# Patient Record
Sex: FEMALE | Race: WHITE | NOT HISPANIC OR LATINO | Employment: OTHER | URBAN - METROPOLITAN AREA
[De-identification: names, ages, dates, MRNs, and addresses within clinical notes are randomized per-mention and may not be internally consistent; named-entity substitution may affect disease eponyms.]

---

## 2017-10-06 ENCOUNTER — APPOINTMENT (OUTPATIENT)
Dept: WOMENS IMAGING | Facility: HOSPITAL | Age: 82
End: 2017-10-06

## 2017-10-06 PROCEDURE — 77063 BREAST TOMOSYNTHESIS BI: CPT | Performed by: RADIOLOGY

## 2017-10-06 PROCEDURE — 77067 SCR MAMMO BI INCL CAD: CPT | Performed by: RADIOLOGY

## 2018-10-08 ENCOUNTER — APPOINTMENT (OUTPATIENT)
Dept: WOMENS IMAGING | Facility: HOSPITAL | Age: 83
End: 2018-10-08

## 2018-10-08 PROCEDURE — 77067 SCR MAMMO BI INCL CAD: CPT | Performed by: RADIOLOGY

## 2018-10-08 PROCEDURE — 77063 BREAST TOMOSYNTHESIS BI: CPT | Performed by: RADIOLOGY

## 2020-08-12 ENCOUNTER — TRANSCRIBE ORDERS (OUTPATIENT)
Dept: ADMINISTRATIVE | Facility: HOSPITAL | Age: 85
End: 2020-08-12

## 2020-08-12 DIAGNOSIS — R42 DIZZY: ICD-10-CM

## 2020-08-12 DIAGNOSIS — R51.9 NONINTRACTABLE HEADACHE, UNSPECIFIED CHRONICITY PATTERN, UNSPECIFIED HEADACHE TYPE: Primary | ICD-10-CM

## 2020-08-20 ENCOUNTER — HOSPITAL ENCOUNTER (OUTPATIENT)
Dept: CT IMAGING | Facility: HOSPITAL | Age: 85
Discharge: HOME | End: 2020-08-20
Admitting: NURSE PRACTITIONER

## 2020-08-20 DIAGNOSIS — R51.9 NONINTRACTABLE HEADACHE, UNSPECIFIED CHRONICITY PATTERN, UNSPECIFIED HEADACHE TYPE: ICD-10-CM

## 2020-08-20 DIAGNOSIS — R42 DIZZY: ICD-10-CM

## 2020-08-20 PROCEDURE — 70450 CT HEAD/BRAIN W/O DYE: CPT | Performed by: RADIOLOGY

## 2020-08-20 PROCEDURE — 70450 CT HEAD/BRAIN W/O DYE: CPT

## 2020-08-26 ENCOUNTER — APPOINTMENT (OUTPATIENT)
Dept: WOMENS IMAGING | Facility: HOSPITAL | Age: 85
End: 2020-08-26

## 2020-08-26 PROCEDURE — 77063 BREAST TOMOSYNTHESIS BI: CPT | Performed by: RADIOLOGY

## 2020-08-26 PROCEDURE — 77067 SCR MAMMO BI INCL CAD: CPT | Performed by: RADIOLOGY

## 2021-07-14 ENCOUNTER — OUTSIDE FACILITY SERVICE (OUTPATIENT)
Dept: CARDIOLOGY | Facility: CLINIC | Age: 86
End: 2021-07-14

## 2021-07-14 PROCEDURE — 78452 HT MUSCLE IMAGE SPECT MULT: CPT | Performed by: INTERNAL MEDICINE

## 2021-07-14 PROCEDURE — 93018 CV STRESS TEST I&R ONLY: CPT | Performed by: INTERNAL MEDICINE

## 2023-04-28 ENCOUNTER — OFFICE VISIT (OUTPATIENT)
Dept: UROLOGY | Facility: CLINIC | Age: 88
End: 2023-04-28
Payer: MEDICARE

## 2023-04-28 VITALS
HEART RATE: 85 BPM | WEIGHT: 150 LBS | DIASTOLIC BLOOD PRESSURE: 82 MMHG | BODY MASS INDEX: 25.61 KG/M2 | SYSTOLIC BLOOD PRESSURE: 182 MMHG | HEIGHT: 64 IN

## 2023-04-28 DIAGNOSIS — N39.0 RECURRENT UTI (URINARY TRACT INFECTION): Primary | ICD-10-CM

## 2023-04-28 DIAGNOSIS — R33.9 INCOMPLETE EMPTYING OF BLADDER: ICD-10-CM

## 2023-04-28 DIAGNOSIS — R35.0 FREQUENCY OF MICTURITION: ICD-10-CM

## 2023-04-28 LAB
BILIRUB BLD-MCNC: NEGATIVE MG/DL
CLARITY, POC: CLEAR
COLOR UR: YELLOW
EXPIRATION DATE: ABNORMAL
GLUCOSE UR STRIP-MCNC: NEGATIVE MG/DL
KETONES UR QL: NEGATIVE
LEUKOCYTE EST, POC: ABNORMAL
Lab: ABNORMAL
NITRITE UR-MCNC: NEGATIVE MG/ML
PH UR: 6.5 [PH] (ref 5–8)
PROT UR STRIP-MCNC: NEGATIVE MG/DL
RBC # UR STRIP: NEGATIVE /UL
SP GR UR: 1.01 (ref 1–1.03)
UROBILINOGEN UR QL: NORMAL

## 2023-04-28 PROCEDURE — 87086 URINE CULTURE/COLONY COUNT: CPT | Performed by: NURSE PRACTITIONER

## 2023-04-28 RX ORDER — HYDROCHLOROTHIAZIDE 25 MG/1
1 TABLET ORAL DAILY
COMMUNITY
Start: 2023-03-08

## 2023-04-28 RX ORDER — PHENAZOPYRIDINE HYDROCHLORIDE 200 MG/1
200 TABLET, FILM COATED ORAL 3 TIMES DAILY PRN
Qty: 20 TABLET | Refills: 0 | Status: SHIPPED | OUTPATIENT
Start: 2023-04-28

## 2023-04-28 RX ORDER — METOPROLOL SUCCINATE 50 MG/1
TABLET, EXTENDED RELEASE ORAL
COMMUNITY
Start: 2023-03-30

## 2023-04-28 RX ORDER — ALBUTEROL SULFATE 90 UG/1
AEROSOL, METERED RESPIRATORY (INHALATION) SEE ADMIN INSTRUCTIONS
COMMUNITY
Start: 2023-04-12

## 2023-04-28 RX ORDER — ASPIRIN 81 MG/1
81 TABLET ORAL DAILY
COMMUNITY

## 2023-04-28 RX ORDER — NIFEDIPINE 30 MG/1
1 TABLET, FILM COATED, EXTENDED RELEASE ORAL DAILY
COMMUNITY
Start: 2023-02-21

## 2023-04-28 RX ORDER — MECLIZINE HYDROCHLORIDE 25 MG/1
TABLET ORAL
COMMUNITY
Start: 2023-01-19

## 2023-04-28 RX ORDER — OMEPRAZOLE 20 MG/1
1 CAPSULE, DELAYED RELEASE ORAL DAILY
COMMUNITY
Start: 2023-02-08

## 2023-04-28 RX ORDER — BUSPIRONE HYDROCHLORIDE 5 MG/1
5 TABLET ORAL 3 TIMES DAILY PRN
COMMUNITY
Start: 2023-04-11

## 2023-04-28 RX ORDER — GENTAMICIN SULFATE 40 MG/ML
80 INJECTION, SOLUTION INTRAMUSCULAR; INTRAVENOUS ONCE
Status: COMPLETED | OUTPATIENT
Start: 2023-04-28 | End: 2023-04-28

## 2023-04-28 RX ORDER — LEVOTHYROXINE SODIUM 0.1 MG/1
TABLET ORAL
COMMUNITY
Start: 2023-02-08

## 2023-04-28 RX ORDER — ENALAPRIL MALEATE 20 MG/1
1 TABLET ORAL EVERY 12 HOURS SCHEDULED
COMMUNITY
Start: 2023-04-11

## 2023-04-28 RX ORDER — ONDANSETRON 4 MG/1
4 TABLET, FILM COATED ORAL EVERY 12 HOURS PRN
Qty: 20 TABLET | Refills: 0 | Status: SHIPPED | OUTPATIENT
Start: 2023-04-28

## 2023-04-28 RX ORDER — NITROFURANTOIN 25; 75 MG/1; MG/1
CAPSULE ORAL
Qty: 56 CAPSULE | Refills: 3 | Status: SHIPPED | OUTPATIENT
Start: 2023-04-28

## 2023-04-28 RX ADMIN — GENTAMICIN SULFATE 80 MG: 40 INJECTION, SOLUTION INTRAMUSCULAR; INTRAVENOUS at 16:19

## 2023-04-28 NOTE — PROGRESS NOTES
Chief Complaint  RECURRENT UTI /DYSURIA (NEW PT WITH UTI/ACUTE CYSTITIS/DYSURIA)    Adelaida Hines presents to [facility] for   History of Present Illness     MS MILADY HINES is a pleasant 87-year-old female who presents to clinic today for evaluation as a new patient consult. Patient has been referred to clinic by her PCP with concerns of recurrent UTIs. She reports NUMEROUS OTHER CONCERNS INCLUDING: constant burning each time she has an infection, BACK PAIN, FREQUENCY, URGENCY, patient reports her symptoms have been ongoing for about a year, recently becoming very bothersome to her.     0n initial presentation in clinic today.She reports she is not doing well. She mentions she has recurrent UTIs. She notes it has been going on for over a year. She mentions it is resistant to any antibiotics. She notes she can not take Bactrim due to hypertension medication.Her urine dip show trace leukocytes otherwise negative for any infection, it is negative for gross/microhematuria, PVR-0ml     She reports she has been on 3 antibiotics in the past 2 months. She mentions she can not remember the name of antibiotics. She reports she can not take enalapril. She mentions she has burning all the time. She notes she had a culture done and was told she had E. coli. She mentions she does not like water. She notes since she had COVID-19 3 years ago, she has not been able to taste anything. She reports she has not been able to eat very well. She admits she drinks water and Gatorade. She states she works 96 hours the last 2 weeks.     She mentions she has urinary frequency and urgency. She mentions she is up all night to urinate. She denies wearing diapers. She denies having a hysterectomy. She has 3 children. She denies ever having kidney stones. She denies feeling any prolapse. She denies nausea.    Objective   Vital Signs:   BP (!) 182/82 (BP Location: Left arm, Patient  "Position: Sitting, Cuff Size: Adult)   Pulse 85   Ht 162.6 cm (64\")   Wt 68 kg (150 lb)   BMI 25.75 kg/m²       ROS:   Review of Systems   Constitutional: Negative.  Negative for activity change, appetite change, diaphoresis, fatigue, fever, unexpected weight gain and unexpected weight loss.   HENT: Negative for congestion, ear discharge, ear pain, nosebleeds, rhinorrhea, sinus pressure and sore throat.    Eyes: Negative for blurred vision, double vision, photophobia, pain, redness and visual disturbance.   Respiratory: Negative for apnea, cough, chest tightness, shortness of breath, wheezing and stridor.    Cardiovascular: Negative for chest pain and palpitations.   Gastrointestinal: Negative for abdominal distention, abdominal pain, constipation, diarrhea, nausea and vomiting.   Endocrine: Negative for polydipsia, polyphagia and polyuria.   Genitourinary: Positive for dysuria, frequency and urinary incontinence. Negative for decreased urine volume, difficulty urinating, flank pain, hematuria and urgency.   Musculoskeletal: Positive for back pain. Negative for arthralgias and joint swelling.   Skin: Negative for pallor, rash and wound.   Neurological: Negative for dizziness, tremors, syncope, weakness, light-headedness, memory problem and confusion.   Psychiatric/Behavioral: Negative for behavioral problems.      Physical Exam  Constitutional:       General: She is not in acute distress.     Appearance: She is well-developed.   HENT:      Head: Normocephalic and atraumatic.   Eyes:      Pupils: Pupils are equal, round, and reactive to light.   Neck:      Thyroid: No thyromegaly.      Trachea: No tracheal deviation.   Cardiovascular:      Rate and Rhythm: Normal rate and regular rhythm.      Heart sounds: No murmur heard.  Pulmonary:      Effort: Pulmonary effort is normal. No respiratory distress.      Breath sounds: Normal breath sounds. No stridor. No wheezing.   Abdominal:      General: Bowel sounds are " normal.      Palpations: Abdomen is soft.      Tenderness: There is no abdominal tenderness.   Genitourinary:     Labia:         Right: No tenderness.         Left: No tenderness.       Vagina: Normal. No vaginal discharge.   Musculoskeletal:         General: No deformity. Normal range of motion.      Cervical back: Normal range of motion.   Skin:     General: Skin is warm and dry.      Coloration: Skin is not pale.      Findings: No erythema or rash.   Neurological:      Mental Status: She is alert and oriented to person, place, and time.      Cranial Nerves: No cranial nerve deficit.      Sensory: No sensory deficit.      Coordination: Coordination normal.   Psychiatric:         Behavior: Behavior normal.         Thought Content: Thought content normal.         Judgment: Judgment normal.        Result Review :     UA        4/28/2023    15:04   Urinalysis   Ketones, UA Negative     Leukocytes, UA Trace            Assessment and Plan    Problem List Items Addressed This Visit    None  Visit Diagnoses     Recurrent UTI (urinary tract infection)    -  Primary    Relevant Medications    nitrofurantoin, macrocrystal-monohydrate, (Macrobid) 100 MG capsule    ondansetron (Zofran) 4 MG tablet    phenazopyridine (Pyridium) 200 MG tablet    gentamicin (GARAMYCIN) injection 80 mg (Completed)    Frequency of micturition        Relevant Medications    nitrofurantoin, macrocrystal-monohydrate, (Macrobid) 100 MG capsule    ondansetron (Zofran) 4 MG tablet    phenazopyridine (Pyridium) 200 MG tablet    Other Relevant Orders    POC Urinalysis Dipstick, Automated (Completed)    Urine Culture - Urine, Urine, Random Void    Incomplete emptying of bladder        Relevant Medications    nitrofurantoin, macrocrystal-monohydrate, (Macrobid) 100 MG capsule    ondansetron (Zofran) 4 MG tablet    phenazopyridine (Pyridium) 200 MG tablet    Other Relevant Orders    Bladder Scan (Completed)           ASSESSMENT  RECURRENT UTI/URINE  FRQUENCY/ATROPHIC VAG  MS MILADY HINES is a pleasant 87-year-old female who presents to clinic today for evaluation with concerns of recurrent UTIs. She reports NUMEROUS OTHER CONCERNS INCLUDING: constant burning each time she has an infection, BACK PAIN, FREQUENCY, URGENCY, patient reports her symptoms have been ongoing for about a year, recently becoming very bothersome to her.Her urine dipstick show  trace leukocytes otherwise negative for any infection, it is negative for gross/microhematuria, PVR-0ml    She reports doing relatively better on her antibiotic prophylaxis, and would like to continue. We discussed the types of organisms that are found in the urinary tract indicating that the vast majority are results of the patient's own gastrointestinal eric.  We discussed how many of the antibiotics that are utilized can actually exacerbate these infections by creating resistant organisms and there is only a very few antibiotics that are concentrated in the urine and do not affect the rectal reservoir nor cause recurrent yeast vaginitis.      We discussed the risk factors for recurrent infections being intercourse in younger patients and atrophic changes in older patients.  We discussed the symptoms that are found including pain, pressure, burning, frequency, urgency suprapubic pain and painful intercourse.  I discussed upper tract symptoms including fevers, chills, and indicated the workup would be much more aggressive if the patient were to present with recurrent infections in the face of upper tract symptomatology such as fever. I discussed the history of vesicoureteral reflux in young patients and finally chronic renal scarring as a result of such.     Again, we discussed detrusor instability which is an  irritative bladder symptomatology most likely related to factors such as intake of bladder irritants, postinfectious irritation, prolapse, with a very large differential diagnosis.  The mainstay of  treatment has been tight cholinergics which basically caused the bladder to have decreased contractility.  We have discussed the side effects of these treatments including dry mouth, double vision, and increasing constipation. Patient adamant at this time to try meds.                PLAN  GENTAMYCIN 80 MG IM X 1 DOSE GIVEN IN CLINIC    We resent her urine for culture. I will call her with results if any bacteria growth not sensitive to her current therapy of Macrobid.    Will START Macrobid 100 mg PO Daily Suppressive Therapy.     She HAS BEEN encouraged to increase her p.o. fluid intake to at least 1 to 2 L daily and avoid bladder irritants such as caffeine products, spicy foods, and citrusy foods.     I recommend concomitant probiotics with treatment with antibiotics to protect the rectal reservoir including over-the-counter yogurt preparations to louis oral pills containing the appropriate probiotics. Patient reports the diligent use of Probiotics.    She is to also continue other medications for yeast vaginitis, atrophic vaginitis as prescribed above.    Will see her back in 6 weeks for Follow up in clinic and recheck her urinalysis at that time.    Patient is agreeable to plan of care.    Patient reports that she is not currently experiencing any symptoms of urinary incontinence.    BMI is >= 25 and <30. (Overweight) The following options were offered after discussion;: weight loss educational material (shared in after visit summary), exercise counseling/recommendations and nutrition counseling/recommendations    RADIOLOGY (CT AND/OR KUB):    CT Abdomen and Pelvis: No results found for this or any previous visit.     CT Stone Protocol: No results found for this or any previous visit.     KUB: No results found for this or any previous visit.       LABS (3 MONTHS):    Office Visit on 04/28/2023   Component Date Value Ref Range Status   • Color 04/28/2023 Yellow  Yellow, Straw, Dark Yellow, [place] Final   •  Clarity, UA 04/28/2023 Clear  Clear Final   • Specific Gravity  04/28/2023 1.015  1.005 - 1.030 Final   • pH, Urine 04/28/2023 6.5  5.0 - 8.0 Final   • Leukocytes 04/28/2023 Trace (A)  Negative Final   • Nitrite, UA 04/28/2023 Negative  Negative Final   • Protein, POC 04/28/2023 Negative  Negative mg/dL Final   • Glucose, UA 04/28/2023 Negative  Negative mg/dL Final   • Ketones, UA 04/28/2023 Negative  Negative Final   • Urobilinogen, UA 04/28/2023 Normal  Normal, 0.2 E.U./dL Final   • Bilirubin 04/28/2023 Negative  Negative Final   • Blood, UA 04/28/2023 Negative  Negative Final   • Lot Number 04/28/2023 [id number]   Final   • Expiration Date 04/28/2023 2/8/243   Final          Smoking Cessation Counseling:  Former smoker.  Patient does not currently use any tobacco products.     Follow Up   Return in about 6 weeks (around 6/12/2023) for Next scheduled follow up, RECURRENT UTI/DYSURIA/DETRUSSOR INSTABILITY.       1. Recurrent UTI  - Gentamicin injection given today.  - Continue Macrobid as prescribed.  - Pyridium prescribed for 3 days.  - Increase fluids.  - Follow-up in 1 month.  - Advised to stay away from caffeine, citrus, spices, and etc.    2. Atrophic vaginitis  - Premarin cream 2 times a week.  - Lidocaine prescribed for pain.    Patient was given instructions and counseling regarding her condition or for health maintenance advice. Please see specific information pulled into the AVS if appropriate.          This document has been electronically signed by Griselda Cheng-Akwa, APRN   April 30, 2023 23:22 EDT      Dictated Utilizing Dragon Dictation: Part of this note may be an electronic transcription/translation of spoken language to printed text using the Dragon Dictation System.    Transcribed from ambient dictation for Griselda Cheng-Akwa, APRN by Judi Blue.  04/28/23   17:05 EDT    Patient or patient representative verbalized consent to the visit recording.  I have personally performed the  services described in this document as transcribed by the above individual, and it is both accurate and complete.  Griselda Cheng-Akwa, APRN  4/30/2023  23:07 EDT

## 2023-05-01 LAB
BACTERIA UR CULT: NORMAL
BACTERIA UR CULT: NORMAL

## 2023-05-02 ENCOUNTER — TELEPHONE (OUTPATIENT)
Dept: UROLOGY | Facility: CLINIC | Age: 88
End: 2023-05-02
Payer: MEDICARE

## 2023-05-02 NOTE — TELEPHONE ENCOUNTER
Called patient to check on her post clinic visit, and to discuss urine culture results showing mixed urogenital eric, otherwise negative for any infection at this time.      She is currently on Macrobid prophylaxis for her recurrent UTIs and will continue taking 1 tablet nightly.  She has been encouraged to increase her p.o. fluid intake, continue therapy, in clinic as discussed 06/12/2023    Left a message on her voicemail to call with any concerns she may have    Thank you    Urine Culture Final report    Result 1 Comment     Comment: Mixed urogenital eric   Less than 10,000 colonies/mL

## 2023-05-03 ENCOUNTER — TELEPHONE (OUTPATIENT)
Dept: UROLOGY | Facility: CLINIC | Age: 88
End: 2023-05-03
Payer: MEDICARE

## 2023-05-03 NOTE — TELEPHONE ENCOUNTER
Spoke with patient she is unable to take macrobid do to nausea and unable to sleep so she has stopped it she did say she was increasing water intake and going to do that to see if it helped with infections Chloé is aware

## 2023-05-03 NOTE — TELEPHONE ENCOUNTER
----- Message from Griselda Cheng-Akwa, APRN sent at 5/2/2023  6:17 PM EDT -----  Call patient to check on her post clinic visit, and to discuss urine culture results showing mixed urogenital eric, otherwise negative for any infection at this time.      She is currently on Macrobid prophylaxis for her recurrent UTIs and will continue taking 1 tablet nightly.  She has been encouraged to increase her p.o. fluid intake, continue therapy, in clinic as discussed 06/12/2023    FALLON GARCIA

## 2023-06-12 ENCOUNTER — OFFICE VISIT (OUTPATIENT)
Dept: UROLOGY | Facility: CLINIC | Age: 88
End: 2023-06-12
Payer: MEDICARE

## 2023-06-12 VITALS
WEIGHT: 148.6 LBS | DIASTOLIC BLOOD PRESSURE: 80 MMHG | HEIGHT: 64 IN | HEART RATE: 84 BPM | SYSTOLIC BLOOD PRESSURE: 164 MMHG | BODY MASS INDEX: 25.37 KG/M2

## 2023-06-12 DIAGNOSIS — N95.2 ATROPHIC VAGINITIS: ICD-10-CM

## 2023-06-12 DIAGNOSIS — N34.3 DYSURIA-FREQUENCY SYNDROME: ICD-10-CM

## 2023-06-12 DIAGNOSIS — K21.9 GASTROESOPHAGEAL REFLUX DISEASE, UNSPECIFIED WHETHER ESOPHAGITIS PRESENT: ICD-10-CM

## 2023-06-12 DIAGNOSIS — T17.308D CHOKING, SUBSEQUENT ENCOUNTER: ICD-10-CM

## 2023-06-12 DIAGNOSIS — N39.0 RECURRENT UTI (URINARY TRACT INFECTION): Primary | ICD-10-CM

## 2023-06-12 LAB
BILIRUB BLD-MCNC: NEGATIVE MG/DL
CLARITY, POC: CLEAR
COLOR UR: YELLOW
EXPIRATION DATE: ABNORMAL
GLUCOSE UR STRIP-MCNC: NEGATIVE MG/DL
KETONES UR QL: NEGATIVE
LEUKOCYTE EST, POC: ABNORMAL
Lab: ABNORMAL
NITRITE UR-MCNC: NEGATIVE MG/ML
PH UR: 5 [PH] (ref 5–8)
PROT UR STRIP-MCNC: NEGATIVE MG/DL
RBC # UR STRIP: NEGATIVE /UL
SP GR UR: 1.01 (ref 1–1.03)
UROBILINOGEN UR QL: NORMAL

## 2023-06-12 RX ORDER — GENTAMICIN SULFATE 40 MG/ML
80 INJECTION, SOLUTION INTRAMUSCULAR; INTRAVENOUS ONCE
Status: COMPLETED | OUTPATIENT
Start: 2023-06-12 | End: 2023-06-12

## 2023-06-12 RX ADMIN — GENTAMICIN SULFATE 80 MG: 40 INJECTION, SOLUTION INTRAMUSCULAR; INTRAVENOUS at 13:51

## 2023-06-12 NOTE — PROGRESS NOTES
Chief Complaint  Recurrent Uti's and Difficulty Urinating (6 week fu )    Adelaida Hines presents to [facility] for UTIs/DYSURIA/ATROPHIC VAGINITIS  Difficulty Urinating  Associated symptoms include fatigue and myalgias. Pertinent negatives include no abdominal pain, arthralgias, chest pain, congestion, coughing, diaphoresis, fever, joint swelling, nausea, rash, sore throat, vomiting or weakness.     Mrs. MILADY HINES is a pleasant 87-year-old female who returns to clinic today for evaluation. This is a 6-week follow-up with prior complaints of recurrent UTIs, overactive bladder, complicated by constant burning with urination, frequency, urgency, and back pain.     When initially evaluated, the patient had denied CVA tenderness or abdominal pain. She reported her symptoms had been ongoing for a year and were becoming very bothersome to her. Her urine during her initial evaluation was completely negative for any infection. It, however, did show trace microscopic hematuria and the culture did grow mixed eric. She was placed on antibiotic prophylaxis secondary to her worsening symptoms. However, patient reports she has not been taking it because the Macrobid made her shake and she stayed nervous. She was also placed on Pyridium for burning with urination which she also did not take.     On her initial evaluation in clinic today, she is in no apparent discomfort, reports doing better. Nevertheless, urine today still show 1+ leukocyte esterase. Negative for nitrites. Negative for gross/microscopic hematuria. A postvoid residual is 0 mL.    OVERALL, The patient is doing well and at her baseline she states. Her bladder symptoms have improved. She had some nitrofurantoin at home, but she did not take it. She has been losing weight. She drinks Boost or Ensure secondary to decreased p.o intake.  I recommend that she following with her PCP for labs and  "reevaluation was a discussed appetite stimulants..    She has GERD, and reports she has been choking. She chokes on food. She takes Prilosec. She states that she does not want to see a GI doctor but ultimately agrees to seeing one. She states that she drinks about 4 16.9 ounces bottles of water a day.     She works 80 to 96 hours a week. She lives alone.      Objective   Vital Signs:   /80   Pulse 84   Ht 162.6 cm (64\")   Wt 67.4 kg (148 lb 9.6 oz)   BMI 25.51 kg/m²       ROS:   Review of Systems   Constitutional:  Positive for activity change and fatigue. Negative for appetite change, diaphoresis, fever, unexpected weight gain and unexpected weight loss.   HENT:  Negative for congestion, ear discharge, ear pain, nosebleeds, rhinorrhea, sinus pressure and sore throat.    Eyes:  Negative for blurred vision, double vision, photophobia, pain, redness and visual disturbance.   Respiratory:  Negative for apnea, cough, chest tightness, shortness of breath, wheezing and stridor.    Cardiovascular:  Negative for chest pain and palpitations.   Gastrointestinal:  Positive for constipation. Negative for abdominal distention, abdominal pain, diarrhea, nausea and vomiting.   Endocrine: Negative for polydipsia, polyphagia and polyuria.   Genitourinary:  Positive for difficulty urinating, dysuria, frequency, pelvic pain, urgency and urinary incontinence. Negative for decreased urine volume, flank pain and hematuria.   Musculoskeletal:  Positive for back pain and myalgias. Negative for arthralgias and joint swelling.   Skin:  Negative for pallor, rash and wound.   Neurological:  Positive for dizziness. Negative for tremors, syncope, weakness, light-headedness, memory problem and confusion.   Hematological:  Bruises/bleeds easily.   Psychiatric/Behavioral:  Positive for sleep disturbance and stress. Negative for behavioral problems. The patient is nervous/anxious.       Physical Exam  Constitutional:       General: She is " in acute distress.      Appearance: She is well-developed. She is ill-appearing.   HENT:      Head: Normocephalic and atraumatic.   Eyes:      Pupils: Pupils are equal, round, and reactive to light.   Neck:      Thyroid: No thyromegaly.      Trachea: No tracheal deviation.   Cardiovascular:      Rate and Rhythm: Normal rate and regular rhythm.      Heart sounds: No murmur heard.  Pulmonary:      Effort: Pulmonary effort is normal. No respiratory distress.      Breath sounds: Normal breath sounds. No stridor. No wheezing.   Abdominal:      General: Bowel sounds are normal. There is distension.      Palpations: Abdomen is soft.      Tenderness: There is abdominal tenderness.   Genitourinary:     Labia:         Right: No tenderness.         Left: No tenderness.       Vagina: Normal. No vaginal discharge.      Comments: FREQUENCY, DYSURIA    Musculoskeletal:         General: Tenderness present. No deformity. Normal range of motion.      Cervical back: Normal range of motion.   Skin:     General: Skin is warm and dry.      Capillary Refill: Capillary refill takes less than 2 seconds.      Coloration: Skin is not pale.      Findings: No erythema or rash.   Neurological:      Mental Status: She is alert and oriented to person, place, and time.      Cranial Nerves: No cranial nerve deficit.      Sensory: No sensory deficit.      Coordination: Coordination normal.   Psychiatric:         Behavior: Behavior normal.         Thought Content: Thought content normal.         Judgment: Judgment normal.      Result Review :     UA          4/28/2023    15:04 6/12/2023    13:06   Urinalysis   Ketones, UA Negative  Negative    Leukocytes, UA Trace  Small (1+)      Urine Culture          4/28/2023    15:03   Urine Culture   Urine Culture Final report             Assessment and Plan    Problem List Items Addressed This Visit          Genitourinary and Reproductive     Recurrent UTI (urinary tract infection) - Primary    Relevant Orders     POC Urinalysis Dipstick, Automated (Completed)    Urine Culture - Urine, Urine, Random Void    Atrophic vaginitis    Dysuria-frequency syndrome     Other Visit Diagnoses       Gastroesophageal reflux disease, unspecified whether esophagitis present        Relevant Orders    Ambulatory Referral to Gastroenterology    Choking, subsequent encounter        Relevant Orders    Ambulatory Referral to Gastroenterology                                                        ASSESSMENT               RECURRENT UTI/URINE FRQUENCY/ATROPHIC VAGINITIS  MS MILADY HINES is a pleasant 87-year-old female who presents to clinic today for evaluation with concerns of recurrent UTIs. She reports NUMEROUS OTHER CONCERNS INCLUDING: constant burning each time she has an infection, BACK PAIN, FREQUENCY, URGENCY, patient reports her symptoms have been ongoing for about a year, recently becoming very bothersome to her.Her urine dipstick show  1+ leukocytes otherwise negative for any infection, it is negative for gross AND microhematuria, PVR-0ml.  Patient had been scheduled for lower tract investigation via cystoscopy secondary to episodes of hematuria-resolved.     She reports doing relatively better on her antibiotic prophylaxis, but stopped taking secondary to feeling nervous and shaky.  She reports she has not had any UTIs since then.  We discussed treating her infections only if positive at this time.  Again, we discussed the types of organisms that are found in the urinary tract indicating that the vast majority are results of the patient's own gastrointestinal eric.  We discussed how many of the antibiotics that are utilized can actually exacerbate these infections by creating resistant organisms and there is only a very few antibiotics that are concentrated in the urine and do not affect the rectal reservoir nor cause recurrent yeast vaginitis.       We discussed the risk factors for recurrent infections being intercourse in younger  patients and atrophic changes in older patients.  We discussed the symptoms that are found including pain, pressure, burning, frequency, urgency suprapubic pain and painful intercourse.  I discussed upper tract symptoms including fevers, chills, and indicated the workup would be much more aggressive if the patient were to present with recurrent infections in the face of upper tract symptomatology such as fever. I discussed the history of vesicoureteral reflux in young patients and finally chronic renal scarring as a result of such.      Again, we discussed detrusor instability which is an  irritative bladder symptomatology most likely related to factors such as intake of bladder irritants, postinfectious irritation, prolapse, with a very large differential diagnosis.  The mainstay of treatment has been tight cholinergics which basically caused the bladder to have decreased contractility.  We have discussed the side effects of these treatments including dry mouth, double vision, and increasing constipation. Patient adamant at this time to try meds.                           PLAN  We resent her urine for culture. I will call her with results if any bacteria growth not sensitive to her current therapy of Macrobid.    She was scheduled for lower tract investigation on 07/25 of 2023 with Dr. Madden secondary to hematuria-we will keep appointment     Will STOP Macrobid 100 mg PO Daily Suppressive Therapy-PATIENT UNABLE TO TOLERATE .  We discussed treating her infections only if positive bacterial growth.     She HAS BEEN encouraged to increase her p.o. fluid intake to at least 1 to 2 L daily and avoid bladder irritants such as caffeine products, spicy foods, and citrusy foods.      I recommend concomitant probiotics with treatment with antibiotics to protect the rectal reservoir including over-the-counter yogurt preparations to louis oral pills containing the appropriate probiotics. Patient reports the diligent use of  Probiotics.     She is to also continue other medications for yeast vaginitis, atrophic vaginitis as prescribed above.    REFERRAL TO GI FOR GERD, ESOPHAGITIS WITH FREQUENT CHOKING episodes requiring Heimlich maneuver us-referral placed, appointment scheduled     Will see her back in 6 weeks for Follow up in clinic and recheck her urinalysis at that time.    She may return sooner if worsening UTI symptoms.     Patient is agreeable to plan of care.     Patient reports that she is not currently experiencing any symptoms of urinary incontinence.    RADIOLOGY (CT AND/OR KUB):    CT Abdomen and Pelvis: No results found for this or any previous visit.     CT Stone Protocol: No results found for this or any previous visit.     KUB: No results found for this or any previous visit.       LABS (3 MONTHS):    Office Visit on 06/12/2023   Component Date Value Ref Range Status   • Color 06/12/2023 Yellow  Yellow, Straw, Dark Yellow, Ainsley Final   • Clarity, UA 06/12/2023 Clear  Clear Final   • Specific Gravity  06/12/2023 1.015  1.005 - 1.030 Final   • pH, Urine 06/12/2023 5.0  5.0 - 8.0 Final   • Leukocytes 06/12/2023 Small (1+) (A)  Negative Final   • Nitrite, UA 06/12/2023 Negative  Negative Final   • Protein, POC 06/12/2023 Negative  Negative mg/dL Final   • Glucose, UA 06/12/2023 Negative  Negative mg/dL Final   • Ketones, UA 06/12/2023 Negative  Negative Final   • Urobilinogen, UA 06/12/2023 Normal  Normal, 0.2 E.U./dL Final   • Bilirubin 06/12/2023 Negative  Negative Final   • Blood, UA 06/12/2023 Negative  Negative Final   • Lot Number 06/12/2023 n   Final   • Expiration Date 06/12/2023 n   Final   Office Visit on 04/28/2023   Component Date Value Ref Range Status   • Color 04/28/2023 Yellow  Yellow, Straw, Dark Yellow, Ainsley Final   • Clarity, UA 04/28/2023 Clear  Clear Final   • Specific Gravity  04/28/2023 1.015  1.005 - 1.030 Final   • pH, Urine 04/28/2023 6.5  5.0 - 8.0 Final   • Leukocytes 04/28/2023 Trace (A)   Negative Final   • Nitrite, UA 04/28/2023 Negative  Negative Final   • Protein, POC 04/28/2023 Negative  Negative mg/dL Final   • Glucose, UA 04/28/2023 Negative  Negative mg/dL Final   • Ketones, UA 04/28/2023 Negative  Negative Final   • Urobilinogen, UA 04/28/2023 Normal  Normal, 0.2 E.U./dL Final   • Bilirubin 04/28/2023 Negative  Negative Final   • Blood, UA 04/28/2023 Negative  Negative Final   • Lot Number 04/28/2023 [id number]   Final   • Expiration Date 04/28/2023 2/8/243   Final   • Urine Culture 04/28/2023 Final report   Final   • Result 1 04/28/2023 Comment   Final    Mixed urogenital eric  Less than 10,000 colonies/mL        Smoking Cessation Counseling:  Never a smoker.  Patient does not currently use any tobacco products.       Follow Up   Return in about 6 months (around 12/12/2023) for Next scheduled follow up, RECURRENT UTI/DYSURIA/DETRUSSOR INSTABILITY/ATROPHIC VAGINITIS.    Patient was given instructions and counseling regarding her condition or for health maintenance advice. Please see specific information pulled into the AVS if appropriate.          This document has been electronically signed by Griselda Cheng-Akwa, APRN   June 12, 2023 13:42 EDT      Dictated Utilizing Dragon Dictation: Part of this note may be an electronic transcription/translation of spoken language to printed text using the Dragon Dictation System.     Transcribed from ambient dictation for Griselda Cheng-Akwa, APRN by Sharath Varela.  06/12/23   14:40 EDT    Patient or patient representative verbalized consent to the visit recording.  I have personally performed the services described in this document as transcribed by the above individual, and it is both accurate and complete.

## 2023-06-14 LAB
BACTERIA UR CULT: NORMAL
BACTERIA UR CULT: NORMAL

## 2023-06-15 ENCOUNTER — TELEPHONE (OUTPATIENT)
Dept: UROLOGY | Facility: CLINIC | Age: 88
End: 2023-06-15
Payer: MEDICARE

## 2023-06-15 NOTE — TELEPHONE ENCOUNTER
----- Message from Griselda Cheng-Akwa, APRN sent at 6/14/2023  6:39 AM EDT -----  Please kindly let patient know her urine culture results showed mixed eric otherwise negative for any bacterial infection at this time.  She may drop off another urine sample if she feels symptomatic, if not follow-up in clinic as discussed for cystoscopy with Dr. Madden.    Thank you

## 2023-07-25 ENCOUNTER — PROCEDURE VISIT (OUTPATIENT)
Dept: UROLOGY | Facility: CLINIC | Age: 88
End: 2023-07-25
Payer: MEDICARE

## 2023-07-25 VITALS — HEIGHT: 64 IN | BODY MASS INDEX: 25.51 KG/M2

## 2023-07-25 DIAGNOSIS — N39.0 RECURRENT UTI (URINARY TRACT INFECTION): Primary | ICD-10-CM

## 2023-07-25 RX ORDER — GENTAMICIN SULFATE 40 MG/ML
80 INJECTION, SOLUTION INTRAMUSCULAR; INTRAVENOUS ONCE
Status: COMPLETED | OUTPATIENT
Start: 2023-07-25 | End: 2023-07-25

## 2023-07-25 RX ADMIN — GENTAMICIN SULFATE 80 MG: 40 INJECTION, SOLUTION INTRAMUSCULAR; INTRAVENOUS at 13:18

## 2023-07-28 LAB
BACTERIA UR CULT: NORMAL
BACTERIA UR CULT: NORMAL